# Patient Record
Sex: FEMALE | Race: WHITE | Employment: FULL TIME | URBAN - METROPOLITAN AREA
[De-identification: names, ages, dates, MRNs, and addresses within clinical notes are randomized per-mention and may not be internally consistent; named-entity substitution may affect disease eponyms.]

---

## 2018-02-27 ENCOUNTER — THERAPY VISIT (OUTPATIENT)
Dept: PHYSICAL THERAPY | Facility: CLINIC | Age: 53
End: 2018-02-27
Payer: COMMERCIAL

## 2018-02-27 DIAGNOSIS — G89.29 CHRONIC LEFT SHOULDER PAIN: Primary | ICD-10-CM

## 2018-02-27 DIAGNOSIS — M25.512 CHRONIC LEFT SHOULDER PAIN: Primary | ICD-10-CM

## 2018-02-27 PROCEDURE — 97161 PT EVAL LOW COMPLEX 20 MIN: CPT | Mod: GP | Performed by: PHYSICAL THERAPIST

## 2018-02-27 PROCEDURE — 97110 THERAPEUTIC EXERCISES: CPT | Mod: GP | Performed by: PHYSICAL THERAPIST

## 2018-02-27 NOTE — LETTER
[facility] ATHLETIC [facility] PHYSICAL THERAPY  [address] [address]  [phone]    2018    Re: Nelsy Lopez   :   1965  MRN:  [id number]   REFERRING PHYSICIAN:   Sima Spring    [facility] ATHLETIC [facility] PHYSICAL [facility]    Date of Initial Evaluation:  2018  Visits:  Rxs Used: 1  Reason for Referral:  Chronic left shoulder pain    EVALUATION SUMMARY    [facility] Initial Evaluation  Subjective:  Nelsy Lopez is a 52 year old female with a left shoulder condition.  Condition occurred with:  Unknown cause.  Condition occurred: for unknown reasons.  This is a chronic condition  Insidious onset of L shoulder and upper arm pain on 2018.  She reports no history of neck problems and no current neck pain.  Patient reports pain:  Lateral.  Radiates to:  Upper arm.  Pain is described as sharp and aching and is intermittent and reported as 4/10.   Pain is the same all the time.  Exacerbated by: reaching behind the back, reaching up, reaching out to the side and back, lifting 10# and relieved by nothing (has not tried anything).  Since onset symptoms are gradually improving.  Special tests:  X-ray (L shoulder normal).  Previous treatment: none.    General health as reported by patient is good.  Pertinent medical history includes:  Overweight and migraines.  Medical allergies: no.  Surgical history: hysterectomy.  Current medications:  Pain medication and other (mirtazapine and imetrex).  Current occupation is Marketing support.    Primary job tasks include:  Other (computer work).  Barriers include:  None as reported by the patient.  Red flags:  None as reported by the patient.    Objective:  System  Shoulder Evaluation:  ROM:  AROM:    Flexion:  Left:  Mild restriction      Extension: Left: mild restriction  Abduction:  Left: WNL, painful arc     Internal Rotation:  Left:  L L4-5       External Rotation:  Left:  Mild trestriction      PROM:    Flexion:  Left:  Mild trestriction, pain with overpressure        Abduction:  Left:  WNL, pain with overpressure      External Rotation:  Left:  WNL, pain with overpressure        Pain: repeated L shoulder extension by PT and by pt with wand--decreases during, B after, improved flex  and IR ROM with less pain, alsoless paion midrange abd  Strength:    Flexion: Left:5/5    Pain: -      Abduction:  Left: 5/5   Pain:-/+      Internal Rotation:  Left:5/5      Pain:-      External Rotation:   Left:5/5      Pain:-/+       ROS  Assessment/Plan:    Patient is a 52 year old female with left side shoulder complaints.  Provisional classification of derangement with directional preference for extension.  She had a good response to repeated L shoulder extension with a wand with improved ROM and decreased pain noted.  She should do well with treatment focusing on shouolder extension exercises to improve mechanics, decrease pain, and improve overall mobility and function.    Patient has the following significant findings with corresponding treatment plan.                Diagnosis 1:  L shoulder derangement  Pain -  self management, education, directional preference exercise and home program  Decreased ROM/flexibility - manual therapy, therapeutic exercise and home program  Decreased function - therapeutic activities and home program  Impaired posture - neuro re-education and home program    Therapy Evaluation Codes:   1) History comprised of:   Personal factors that impact the plan of care:      None.    Comorbidity factors that impact the plan of care are:      None.     Medications impacting care: None.  2) Examination of Body Systems comprised of:   Body structures and functions that impact the plan of care:      Shoulder.   Activity limitations that impact the plan of care are:      Dressing, Lifting and reaching.  3) Clinical presentation characteristics  are:   Stable/Uncomplicated.  4) Decision-Making    Low complexity using standardized patient assessment instrument and/or   measureable assessment of functional outcome.  Cumulative Therapy Evaluation is: Low complexity.    Previous and current functional limitations:  (See Goal Flow Sheet for this information)    Short term and Long term goals: (See Goal Flow Sheet for this information)     Communication ability:  Patient appears to be able to clearly communicate and understand verbal and written communication and follow directions correctly.  Treatment Explanation - The following has been discussed with the patient:   RX ordered/plan of care  Anticipated outcomes  Possible risks and side effects  This patient would benefit from PT intervention to resume normal activities.   Rehab potential is good.    Frequency:  1 X week, once daily  Duration:  for 2 weeks tapering to 2 X a month over 1 month  Discharge Plan:  Achieve all LTG.  Independent in home treatment program.  Reach maximal therapeutic benefit.    Thank you for your referral.    INQUIRIES  Therapist: Aide Joiner, PT  [facility] PHYSICAL THERAPY  [address] [address]  Phone: [phone]  Fax: [phone]

## 2018-02-27 NOTE — PROGRESS NOTES
[place] [facility] Initial Evaluation  Subjective:  Patient is a 53 year old female presenting with rehab left shoulder hpi. The history is provided by the patient. No  was used.   Nelsy Lopez is a 52 year old female with a left shoulder condition.  Condition occurred with:  Unknown cause.  Condition occurred: for unknown reasons.  This is a chronic condition  Insidious onset of L shoulder and upper arm pain on 11/27/2017.  She reports no history of neck problems and no current neck pain.    Patient reports pain:  Lateral.  Radiates to:  Upper arm.  Pain is described as sharp and aching and is intermittent and reported as 4/10.   Pain is the same all the time.  Exacerbated by: reaching behind the back, reaching up, reaching out to the side and back, lifting 10# and relieved by nothing (has not tried anything).  Since onset symptoms are gradually improving.  Special tests:  X-ray (L shoulder normal).  Previous treatment: none.    General health as reported by patient is good.  Pertinent medical history includes:  Overweight and migraines.  Medical allergies: no.  Surgical history: hysterectomy.  Current medications:  Pain medication and other (mirtazapine and imetrex).  Current occupation is Marketing support.    Primary job tasks include:  Other (computer work).    Barriers include:  None as reported by the patient.    Red flags:  None as reported by the patient.                        Objective:  System                   Shoulder Evaluation:  ROM:  AROM:    Flexion:  Left:  Mild restriction      Extension: Left: mild restriction  Abduction:  Left: WNL, painful arc       Internal Rotation:  Left:  L L4-5      External Rotation:  Left:  Mild trestriction                    PROM:    Flexion:  Left:  Mild trestriction, pain with overpressure          Abduction:  Left:  WNL, pain with overpressure          External Rotation:  Left:  WNL, pain with overpressure                     Pain: repeated L shoulder extension by PT and by pt with wand--decreases during, B after, improved flex  and IR ROM with less pain, also less pain midrange abd    Strength:    Flexion: Left:5/5    Pain: -        Abduction:  Left: 5/5   Pain:-/+        Internal Rotation:  Left:5/5      Pain:-      External Rotation:   Left:5/5      Pain:-/+                                                      General     ROS    Assessment/Plan:    Patient is a 52 year old female with left side shoulder complaints.  Provisional classification of derangement with directional preference for extension.  She had a good response to repeated L shoulder extension with a wand with improved ROM and decreased pain noted.  She should do well with treatment focusing on shouolder extension exercises to improve mechanics, decrease pain, and improve overall mobility and function.    Patient has the following significant findings with corresponding treatment plan.                Diagnosis 1:  L shoulder derangement  Pain -  self management, education, directional preference exercise and home program  Decreased ROM/flexibility - manual therapy, therapeutic exercise and home program  Decreased function - therapeutic activities and home program  Impaired posture - neuro re-education and home program    Therapy Evaluation Codes:   1) History comprised of:   Personal factors that impact the plan of care:      None.    Comorbidity factors that impact the plan of care are:      None.     Medications impacting care: None.  2) Examination of Body Systems comprised of:   Body structures and functions that impact the plan of care:      Shoulder.   Activity limitations that impact the plan of care are:      Dressing, Lifting and reaching.  3) Clinical presentation characteristics are:   Stable/Uncomplicated.  4) Decision-Making    Low complexity using standardized patient assessment instrument and/or measureable assessment of functional outcome.  Cumulative  Therapy Evaluation is: Low complexity.    Previous and current functional limitations:  (See Goal Flow Sheet for this information)    Short term and Long term goals: (See Goal Flow Sheet for this information)     Communication ability:  Patient appears to be able to clearly communicate and understand verbal and written communication and follow directions correctly.  Treatment Explanation - The following has been discussed with the patient:   RX ordered/plan of care  Anticipated outcomes  Possible risks and side effects  This patient would benefit from PT intervention to resume normal activities.   Rehab potential is good.    Frequency:  1 X week, once daily  Duration:  for 2 weeks tapering to 2 X a month over 1 month  Discharge Plan:  Achieve all LTG.  Independent in home treatment program.  Reach maximal therapeutic benefit.    Please refer to the daily flowsheet for treatment today, total treatment time and time spent performing 1:1 timed codes.

## 2018-02-27 NOTE — MR AVS SNAPSHOT
"              After Visit Summary   2/27/2018    Nelsy Lopez    MRN: [id number]           Patient Information     Date Of Birth          1965        Visit Information        Provider Department      2/27/2018 7:00 AM Aide Joiner, PT [facility] Athletic [facility] Physical Therapy        Today's Diagnoses     Chronic left shoulder pain    -  1       Follow-ups after your visit        Your next 10 appointments already scheduled     Mar 06, 2018  7:00 AM CST   [facility] Extremity with Aide Joiner PT   [facility] ([facility]  )    [address] [place] [address] [address]  [facility] [address]   [phone]            Mar 13, 2018  7:00 AM CDT   [facility] Extremity with Aide Joiner PT   [facility] ([facility]  )    [address]  [facility] [id number] [phone]              Who to contact     If you have questions or need follow up information about today's clinic visit or your schedule please contact [facility] ATHLETIC [facility] PHYSICAL THERAPY directly at [phone].  Normal or non-critical lab and imaging results will be communicated to you by Accuradiohart, letter or phone within 4 business days after the clinic has received the results. If you do not hear from us within 7 days, please contact the clinic through Accuradiohart or phone. If you have a critical or abnormal lab result, we will notify you by phone as soon as possible.  Submit refill requests through Livelens or call your pharmacy and they will forward the refill request to us. Please allow 3 business days for your refill to be completed.          Additional Information About Your Visit        MyChart Information     Livelens lets you send messages to your doctor, view your test results, renew your prescriptions, schedule appointments and more. To sign up, go to [email or web address] . Click on \"Log in\" on the " "left side of the screen, which will take you to the Welcome page. Then click on \"Sign up Now\" on the right side of the page.     You will be asked to enter the access code listed below, as well as some personal information. Please follow the directions to create your username and password.     Your access code is: [email or web address]  Expires: 2018  9:17 AM     Your access code will  in 90 days. If you need help or a new code, please call your [facility] or [phone].        Care EveryWhere ID     This is your Care EveryWhere ID. This could be used by other organizations to access your [place] medical records  [id number]         Blood Pressure from Last 3 Encounters:   14 133/88    Weight from Last 3 Encounters:   14 115.2 kg (254 lb)              We Performed the Following     EMERSON Inital Eval Report     PT Eval, Low Complexity (82025)     Therapeutic Exercises        Primary Care Provider Office Phone # Fax #    Bam Channing Guevara [phone]       [facility] [address] [facility] [address]  [facility] [address]        Equal Access to Services     [facility]: Hadii aad ku hadasho Soomaali, waaxda luqadaha, qaybta kaalmada adeegyada, waxay augustin hayblaynen marlene nayak ah. So [facility] [phone].    ATENCIÓN: Si habla español, tiene a cid disposición servicios gratuitos de asistencia lingüística. Diana al [phone].    We comply with applicable federal civil rights laws and [place] laws. We do not discriminate on the basis of race, color, national origin, age, disability, sex, sexual orientation, or gender identity.            Thank you!     Thank you for choosing [facility] PHYSICAL THERAPY  for your care. Our goal is always to provide you with excellent care. Hearing back from our patients is one way we can continue to improve our services. Please take a few minutes to complete the written survey that you may receive in the mail after " your visit with us. Thank you!             Your Updated Medication List - Protect others around you: Learn how to safely use, store and throw away your medicines at www.disposemymeds.org.          This list is accurate as of 2/27/18  9:17 AM.  Always use your most recent med list.                   Brand Name Dispense Instructions for use Diagnosis    calcium polycarbophil 625 MG tablet    FIBERCON     Take 2 tablets by mouth 2 times daily        IBUPROFEN PO      Take 200 mg by mouth as needed 2 tabs        IMITREX PO      Take 100 mg by mouth every 8 hours as needed for migraine        Multi-vitamin Tabs tablet      Take 1 tablet by mouth daily        oxyCODONE IR 5 MG tablet    ROXICODONE    30 tablet    Take 1-2 tablets (5-10 mg) by mouth every 3 hours as needed for pain or other (Moderate to Severe)    Postoperative pain       REMERON PO      Take 30 mg by mouth At Bedtime 1 1/2 tabs        triamcinolone 0.1 % cream    KENALOG     Apply topically 2 times daily

## 2018-03-13 ENCOUNTER — THERAPY VISIT (OUTPATIENT)
Dept: PHYSICAL THERAPY | Facility: CLINIC | Age: 53
End: 2018-03-13
Payer: COMMERCIAL

## 2018-03-13 DIAGNOSIS — G89.29 CHRONIC LEFT SHOULDER PAIN: ICD-10-CM

## 2018-03-13 DIAGNOSIS — M25.512 CHRONIC LEFT SHOULDER PAIN: ICD-10-CM

## 2018-03-13 PROCEDURE — 97110 THERAPEUTIC EXERCISES: CPT | Mod: GP | Performed by: PHYSICAL THERAPIST

## 2018-03-20 ENCOUNTER — THERAPY VISIT (OUTPATIENT)
Dept: PHYSICAL THERAPY | Facility: CLINIC | Age: 53
End: 2018-03-20
Payer: COMMERCIAL

## 2018-03-20 DIAGNOSIS — G89.29 CHRONIC LEFT SHOULDER PAIN: ICD-10-CM

## 2018-03-20 DIAGNOSIS — M25.512 CHRONIC LEFT SHOULDER PAIN: ICD-10-CM

## 2018-03-20 PROCEDURE — 97110 THERAPEUTIC EXERCISES: CPT | Mod: GP | Performed by: PHYSICAL THERAPIST

## 2018-03-27 ENCOUNTER — THERAPY VISIT (OUTPATIENT)
Dept: PHYSICAL THERAPY | Facility: CLINIC | Age: 53
End: 2018-03-27
Payer: COMMERCIAL

## 2018-03-27 DIAGNOSIS — M25.512 CHRONIC LEFT SHOULDER PAIN: ICD-10-CM

## 2018-03-27 DIAGNOSIS — G89.29 CHRONIC LEFT SHOULDER PAIN: ICD-10-CM

## 2018-03-27 PROCEDURE — 97110 THERAPEUTIC EXERCISES: CPT | Mod: GP | Performed by: PHYSICAL THERAPIST

## 2018-03-27 NOTE — MR AVS SNAPSHOT
"              After Visit Summary   3/27/2018    Nelsy Lopez    MRN: [id number]           Patient Information     Date Of Birth          1965        Visit Information        Provider Department      3/27/2018 7:00 AM Aide Joiner PT [facility] Physical Therapy        Today's Diagnoses     Chronic left shoulder pain           Follow-ups after your visit        Your next 10 appointments already scheduled     Apr 10, 2018  7:00 AM CDT   [facility] Extremity with Aide Joiner PT   [facility]    [address] [id number] [phone]              Who to contact     If you have questions or need follow up information about today's clinic visit or your schedule please contact [facility] ATHLETIC [facility] PHYSICAL THERAPY directly at [phone].  Normal or non-critical lab and imaging results will be communicated to you by MyChart, letter or phone within 4 business days after the clinic has received the results. If you do not hear from us within 7 days, please contact the clinic through SonicLivinghart or phone. If you have a critical or abnormal lab result, we will notify you by phone as soon as possible.  Submit refill requests through ReviewZAP or call your pharmacy and they will forward the refill request to us. Please allow 3 business days for your refill to be completed.          Additional Information About Your Visit        MyChart Information     ReviewZAP lets you send messages to your doctor, view your test results, renew your prescriptions, schedule appointments and more. To sign up, go to [email or web address] . Click on \"Log in\" on the left side of the screen, which will take you to the Welcome page. Then click on \"Sign up Now\" on the right side of the page.     You will be asked to enter the access code listed below, as well as some personal information. Please " follow the directions to create your username and password.     Your access code is: [email or web address]  Expires: 2018 10:17 AM     Your access code will  in 90 days. If you need help or a new code, please call your [place] clinic or [phone].        Care EveryWhere ID     This is your Care EveryWhere ID. This could be used by other organizations to access your [place] medical records  [id number]         Blood Pressure from Last 3 Encounters:   14 133/88    Weight from Last 3 Encounters:   14 115.2 kg (254 lb)              We Performed the Following     EMERSON Progress Notes Report     Therapeutic Exercises        Primary Care Provider Office Phone # Fax #    Bma Guevara [phone]       [facility] [address] [address]        Equal Access to Services     [facility]: Hadii aad ku hadasho Soomaali, waaxda luqadaha, qaybta kaalmada adeegyada, waxay idiin hayaan adeeg kharashelton nayak . So [facility] [phone].    ATENCIÓN: Si habla español, tiene a cid disposición servicios gratuitos de asistencia lingüística. Johnnieame al [phone].    We comply with applicable federal civil rights laws and [place] laws. We do not discriminate on the basis of race, color, national origin, age, disability, sex, sexual orientation, or gender identity.            Thank you!     Thank you for choosing [facility] PHYSICAL THERAPY  for your care. Our goal is always to provide you with excellent care. Hearing back from our patients is one way we can continue to improve our services. Please take a few minutes to complete the written survey that you may receive in the mail after your visit with us. Thank you!             Your Updated Medication List - Protect others around you: Learn how to safely use, store and throw away your medicines at www.disposemymeds.org.          This list is accurate as of 3/27/18  8:17 AM.  Always use your  most recent med list.                   Brand Name Dispense Instructions for use Diagnosis    calcium polycarbophil 625 MG tablet    FIBERCON     Take 2 tablets by mouth 2 times daily        IBUPROFEN PO      Take 200 mg by mouth as needed 2 tabs        IMITREX PO      Take 100 mg by mouth every 8 hours as needed for migraine        Multi-vitamin Tabs tablet      Take 1 tablet by mouth daily        oxyCODONE IR 5 MG tablet    ROXICODONE    30 tablet    Take 1-2 tablets (5-10 mg) by mouth every 3 hours as needed for pain or other (Moderate to Severe)    Postoperative pain       REMERON PO      Take 30 mg by mouth At Bedtime 1 1/2 tabs        triamcinolone 0.1 % cream    KENALOG     Apply topically 2 times daily

## 2018-03-27 NOTE — PROGRESS NOTES
Subjective:  HPI                    Objective:  System    Physical Exam    General     ROS    Assessment/Plan:    PROGRESS  REPORT    Progress reporting period is from 02/27/2018 to 03/27/2018.       SUBJECTIVE  Subjective: Patient reports her L shoulder has improved.  She reports she can almost sleep on it now.  She has not tried reaching out to the side to get the mail while in her car since she is trying to avoid the pain.  Reaching behind her back is still stiff and sore.  She still avoids lifting with the L hand.      Current Pain level: 1/10.     Initial Pain level: 4/10.   Changes in function:  Yes, nearly able to lie on L side for sleep.  Adverse reaction to treatment or activity: None    OBJECTIVE  Objective: L shoulder AROM:  mild pain with midrange abduction but able to move through full ROM, IR/extension to T10.  Improved IR/extension to T8 after repeated L shoulder extension with TB assist with forearm pronation.  Corrected technique as pt was resisting band and not allowing full motion.  Pt to continue with extension but with forearm pronation x10 reps, every 2 hours.     ASSESSMENT/PLAN  Patient has been seen for 4 visits with treatment focusing on directional preference exercises for the L shoulder.  She has responded well to repeated L shoulder extension with decreased pain and improved function noted.  She had good results with changing her hand position slightly with the exercise today and will try this at home to assess.  She should see further improvement as she continues with this over time.  She will follow-up a couple of more times to make sure she is progressing.  Updated problem list and treatment plan: Diagnosis 1:  L shoulder pain  Pain -  self management, education, directional preference exercise and home program  Decreased ROM/flexibility - therapeutic exercise and home program  Decreased function - therapeutic activities and home program  Impaired posture - neuro re-education and home  program  STG/LTGs have been met or progress has been made towards goals:  Yes (See Goal flow sheet completed today.)  Assessment of Progress: The patient's condition is improving.  Self Management Plans:  Patient has been instructed in a home treatment program.  Patient  has been instructed in self management of symptoms.  I have re-evaluated this patient and find that the nature, scope, duration and intensity of the therapy is appropriate for the medical condition of the patient.  Nelsy continues to require the following intervention to meet STG and LTG's:  PT    Recommendations:  This patient would benefit from continued therapy.     Frequency:  2 X a month, once daily  Duration:  for 1 months        Please refer to the daily flowsheet for treatment today, total treatment time and time spent performing 1:1 timed codes.

## 2018-03-27 NOTE — LETTER
[facility] PHYSICAL THERAPY  [address] [place] [address]  [facility] [address]  [phone]    2018    Re: Nelsy Lopez   :   1965  MRN:  [id number]   REFERRING PHYSICIAN:   Sima Spring    [facility]IC [facility] PHYSICAL THERAPY    Date of Initial Evaluation:  2018  Visits:  Rxs Used: 4  Reason for Referral:  Chronic left shoulder pain    PROGRESS  REPORT  Progress reporting period is from 2018 to 2018.       SUBJECTIVE  Subjective: Patient reports her L shoulder has improved.  She reports she can almost sleep on it now.  She has not tried reaching out to the side to get the mail while in her car since she is trying to avoid the pain.  Reaching behind her back is still stiff and sore.  She still avoids lifting with the L hand.      Current Pain level: 1/10.     Initial Pain level: 4/10.   Changes in function:  Yes, nearly able to lie on L side for sleep.  Adverse reaction to treatment or activity: None    OBJECTIVE  Objective: L shoulder AROM:  mild pain with midrange abduction but able to move through full ROM, IR/extension to T10.  Improved IR/extension to T8 after repeated L shoulder extension with TB assist with forearm pronation.  Corrected technique as pt was resisting band and not allowing full motion.  Pt to continue with extension but with forearm pronation x10 reps, every 2 hours.     ASSESSMENT/PLAN  Patient has been seen for 4 visits with treatment focusing on directional preference exercises for the L shoulder.  She has responded well to repeated L shoulder extension with decreased pain and improved function noted.  She had good results with changing her hand position slightly with the exercise today and will try this at home to assess.  She should see further improvement as she continues with this over time.  She will follow-up a couple of more times to make sure she is progressing.  Updated  problem list and treatment plan: Diagnosis 1:  L shoulder pain  Pain -  self management, education, directional preference exercise and home program  Decreased ROM/flexibility - therapeutic exercise and home program  Decreased function - therapeutic activities and home program  Impaired posture - neuro re-education and home program  STG/LTGs have been met or progress has been made towards goals:  Yes (See Goal flow sheet completed today.)  Assessment of Progress: The patient's condition is improving.  Self Management Plans:  Patient has been instructed in a home treatment program.  Patient  has been instructed in self management of symptoms.  I have re-evaluated this patient and find that the nature, scope, duration and intensity of the therapy is appropriate for the medical condition of the patient.  Nelsy continues to require the following intervention to meet STG and LTG's:  PT    Recommendations:  This patient would benefit from continued therapy.     Frequency:  2 X a month, once daily  Duration:  for 1 months    Thank you for your referral.    INQUIRIES  Therapist: Aide Joienr, PT   [facility] PHYSICAL THERAPY  [address] [place] [address]  Phone: [phone]  Fax: [phone]

## 2018-04-24 ENCOUNTER — THERAPY VISIT (OUTPATIENT)
Dept: PHYSICAL THERAPY | Facility: CLINIC | Age: 53
End: 2018-04-24
Payer: COMMERCIAL

## 2018-04-24 DIAGNOSIS — G89.29 CHRONIC LEFT SHOULDER PAIN: ICD-10-CM

## 2018-04-24 DIAGNOSIS — M25.512 CHRONIC LEFT SHOULDER PAIN: ICD-10-CM

## 2018-04-24 PROCEDURE — 97110 THERAPEUTIC EXERCISES: CPT | Mod: GP | Performed by: PHYSICAL THERAPIST

## 2018-04-24 PROCEDURE — 97112 NEUROMUSCULAR REEDUCATION: CPT | Mod: GP | Performed by: PHYSICAL THERAPIST

## 2018-05-01 ENCOUNTER — THERAPY VISIT (OUTPATIENT)
Dept: PHYSICAL THERAPY | Facility: CLINIC | Age: 53
End: 2018-05-01
Payer: COMMERCIAL

## 2018-05-01 DIAGNOSIS — M25.512 CHRONIC LEFT SHOULDER PAIN: ICD-10-CM

## 2018-05-01 DIAGNOSIS — G89.29 CHRONIC LEFT SHOULDER PAIN: ICD-10-CM

## 2018-05-01 PROCEDURE — 97110 THERAPEUTIC EXERCISES: CPT | Mod: GP | Performed by: PHYSICAL THERAPIST

## 2018-05-01 NOTE — PROGRESS NOTES
Subjective:  HPI                    Objective:  System    Physical Exam    General     ROS    Assessment/Plan:    DISCHARGE REPORT    Progress reporting period is from 03/27/2018 to 05/01/201.       SUBJECTIVE  Subjective: Patient reports her L shoulder is about 75% back to normal function at this point.  She has seen improvement over the past week with adding the cervical exercises.  She can lie on her L shoulder with only mild discomfort, not even enough to make her move off of it at times.  She can use her L UE to lift groceries onto a counter without pain and can reach out to the side to reach out her car window to get the mail now.  She did her cervical exercises almost every 2 hours and felt like they helped her L shoulder.      Current Pain level: 0/10.     Initial Pain level: 4/10.   Changes in function:  Yes, able to lift/carry with L hand now, can reach out car door window with L UE to get mail, can lie on L side for sleep  Adverse reaction to treatment or activity: None    OBJECTIVE  Objective: L shoulder AROM:  flexion and abduction WNL, mild pain end range abduction.  Decreased pain with abduction after cervical retraction/extension/self-OP.  Trial of prone sustained extension decreased pain with L shoulder abduction.  Pt to add to HEP 2-3x/day or as needed.  Cervical AROM:  WNL all directions.  Has slight pain during retraction/extension/self-OP in sitting but improved with more reps and also after prone sustained extension.       ASSESSMENT/PLAN  Patient has been seen for 6 visits with treatment initially focusing on repeated L shoulder extension exercises to address a shoulder derangement.  She responded very well to this initially, but then progress plateaued.  Trials of several other directions of movement in the L shoulder did not affect symptoms, so focus was shifted to cervical spine.  She has responded well to cervical spine exercises--retraction/extension/self-overpressure--with decreased L  shoulder pain noted.  She feels like she is making good progress and will continue with her exercises independently at his point.    Updated problem list and treatment plan: Diagnosis 1:  L shoulder pain  Pain -  self management, education, directional preference exercise and home program  Decreased function - home program  Impaired posture - home program  STG/LTGs have been met or progress has been made towards goals:  Yes (See Goal flow sheet completed today.)  Assessment of Progress: The patient's condition is improving.  Self Management Plans:  Patient has been instructed in a home treatment program.  Patient is independent in a home treatment program.  Patient  has been instructed in self management of symptoms.  Patient is independent in self management of symptoms.  Nelsy continues to require the following intervention to meet STG and LTG's:  PT intervention is no longer required to meet STG/LTG.    Recommendations:  This patient is ready to be discharged from therapy and continue their home treatment program.    Please refer to the daily flowsheet for treatment today, total treatment time and time spent performing 1:1 timed codes.

## 2018-05-01 NOTE — MR AVS SNAPSHOT
"              After Visit Summary   2018    Nelsy Lopez    MRN: [id number]           Patient Information     Date Of Birth          1965        Visit Information        Provider Department      2018 11:40 AM Aide Joiner PT [facility] Physical Therapy        Today's Diagnoses     Chronic left shoulder pain           Follow-ups after your visit        Who to contact     If you have questions or need follow up information about today's clinic visit or your schedule please contact [facility] ATHLETIC [facility] PHYSICAL [facility] directly at [phone].  Normal or non-critical lab and imaging results will be communicated to you by [employer], letter or phone within 4 business days after the clinic has received the results. If you do not hear from us within 7 days, please contact the clinic through [employer] or phone. If you have a critical or abnormal lab result, we will notify you by phone as soon as possible.  Submit refill requests through [employer] or call your pharmacy and they will forward the refill request to us. Please allow 3 business days for your refill to be completed.          Additional Information About Your Visit        MyChart Information     [employer] lets you send messages to your doctor, view your test results, renew your prescriptions, schedule appointments and more. To sign up, go to [email or web address] . Click on \"Log in\" on the left side of the screen, which will take you to the Welcome page. Then click on \"Sign up Now\" on the right side of the page.     You will be asked to enter the access code listed below, as well as some personal information. Please follow the directions to create your username and password.     Your access code is: [email or web address]  Expires: 2018 10:17 AM     Your access code will  in 90 days. If you need help or a new code, please call your [place] clinic or [phone].        Care EveryWhere ID     " This is your Care EveryWhere ID. This could be used by other organizations to access your [place] medical records  [id number]         Blood Pressure from Last 3 Encounters:   11/26/14 133/88    Weight from Last 3 Encounters:   11/26/14 115.2 kg (254 lb)              We Performed the Following     [facility] Progress Notes Report     Therapeutic Exercises        Primary Care Provider Office Phone # Fax #    Bam Guevara [phone]       [facility] [address] [facility] [address]  [facility] [address]        Equal Access to Services     [facility]: Hadii aad ku hadasho Soomaali, waaxda luqadaha, qaybta kaalmada adeegyada, waxay idiin hayaan adeeg kharashelton laayla . So [facility] [phone].    ATENCIÓN: Si habla español, tiene a cid disposición servicios gratuitos de asistencia lingüística. [facility] [phone].    We comply with applicable federal civil rights laws and [place] laws. We do not discriminate on the basis of race, color, national origin, age, disability, sex, sexual orientation, or gender identity.            Thank you!     Thank you for choosing [facility] PHYSICAL THERAPY  for your care. Our goal is always to provide you with excellent care. Hearing back from our patients is one way we can continue to improve our services. Please take a few minutes to complete the written survey that you may receive in the mail after your visit with us. Thank you!             Your Updated Medication List - Protect others around you: Learn how to safely use, store and throw away your medicines at www.disposemymeds.org.          This list is accurate as of 5/1/18  1:17 PM.  Always use your most recent med list.                   Brand Name Dispense Instructions for use Diagnosis    calcium polycarbophil 625 MG tablet    FIBERCON     Take 2 tablets by mouth 2 times daily        IBUPROFEN PO      Take 200 mg by mouth as needed 2 tabs        IMITREX PO      Take 100 mg by  mouth every 8 hours as needed for migraine        Multi-vitamin Tabs tablet      Take 1 tablet by mouth daily        oxyCODONE IR 5 MG tablet    ROXICODONE    30 tablet    Take 1-2 tablets (5-10 mg) by mouth every 3 hours as needed for pain or other (Moderate to Severe)    Postoperative pain       REMERON PO      Take 30 mg by mouth At Bedtime 1 1/2 tabs        triamcinolone 0.1 % cream    KENALOG     Apply topically 2 times daily

## 2018-05-01 NOTE — LETTER
[place] [facility] PHYSICAL THERAPY  [address] [place] [address]  [facility] [address]  [phone]    May 2, 2018    Re: Nelsy Lopez   :   1965  MRN:  [id number]   REFERRING PHYSICIAN:   Sima Spring    [facility] ATHLETIC [facility] PHYSICAL [facility]    Date of Initial Evaluation:  2018  Visits:  Rxs Used: 6  Reason for Referral:  Chronic left shoulder pain    DISCHARGE REPORT  Progress reporting period is from 2018 to .       SUBJECTIVE  Subjective: Patient reports her L shoulder is about 75% back to normal function at this point.  She has seen improvement over the past week with adding the cervical exercises.  She can lie on her L shoulder with only mild discomfort, not even enough to make her move off of it at times.  She can use her L UE to lift groceries onto a counter without pain and can reach out to the side to reach out her car window to get the mail now.  She did her cervical exercises almost every 2 hours and felt like they helped her L shoulder.      Current Pain level: 0/10.     Initial Pain level: 4/10.   Changes in function:  Yes, able to lift/carry with L hand now, can reach out car door window with L UE to get mail, can lie on L side for sleep  Adverse reaction to treatment or activity: None    OBJECTIVE  Objective: L shoulder AROM:  flexion and abduction WNL, mild pain end range abduction.  Decreased pain with abduction after cervical retraction/extension/self-OP.  Trial of prone sustained extension decreased pain with L shoulder abduction.  Pt to add to HEP 2-3x/day or as needed.  Cervical AROM:  WNL all directions.  Has slight pain during retraction/extension/self-OP in sitting but improved with more reps and also after prone sustained extension.       ASSESSMENT/PLAN  Patient has been seen for 6 visits with treatment initially focusing on repeated L shoulder extension exercises to address a shoulder  derangement.  She responded very well to this initially, but then progress plateaued.  Trials of several other directions of movement in the L shoulder did not affect symptoms, so focus was shifted to cervical spine.  She has responded well to cervical spine exercises--retraction/extension/self-overpressure--with decreased L shoulder pain noted.  She feels like she is making good progress and will continue with her exercises independently at his point.      Updated problem list and treatment plan: Diagnosis 1:  L shoulder pain  Pain -  self management, education, directional preference exercise and home program  Decreased function - home program  Impaired posture - home program  STG/LTGs have been met or progress has been made towards goals:  Yes (See Goal flow sheet completed today.)  Assessment of Progress: The patient's condition is improving.  Self Management Plans:  Patient has been instructed in a home treatment program.  Patient is independent in a home treatment program.  Patient  has been instructed in self management of symptoms.  Patient is independent in self management of symptoms.  Nelsy continues to require the following intervention to meet STG and LTG's:  PT intervention is no longer required to meet STG/LTG.    Recommendations:  This patient is ready to be discharged from therapy and continue their home treatment program.    Thank you for your referral.    INQUIRIES  Therapist: Aide Joiner, PT  [facility] [place] PH[facility]ICAL THERAPY  [address] [address]  Phone: [phone]  Fax: [phone]